# Patient Record
Sex: FEMALE | Race: ASIAN | NOT HISPANIC OR LATINO | URBAN - METROPOLITAN AREA
[De-identification: names, ages, dates, MRNs, and addresses within clinical notes are randomized per-mention and may not be internally consistent; named-entity substitution may affect disease eponyms.]

---

## 2017-01-16 ENCOUNTER — FOLLOW UP ESTABLISHED (OUTPATIENT)
Dept: URBAN - METROPOLITAN AREA CLINIC 51 | Facility: CLINIC | Age: 82
End: 2017-01-16
Payer: MEDICARE

## 2017-01-16 PROCEDURE — 92133 CPTRZD OPH DX IMG PST SGM ON: CPT | Performed by: OPHTHALMOLOGY

## 2017-01-16 PROCEDURE — 92083 EXTENDED VISUAL FIELD XM: CPT | Performed by: OPHTHALMOLOGY

## 2017-01-16 PROCEDURE — 92012 INTRM OPH EXAM EST PATIENT: CPT | Performed by: OPHTHALMOLOGY

## 2017-01-16 RX ORDER — BRINZOLAMIDE 10 MG/ML
1 % SUSPENSION/ DROPS OPHTHALMIC
Qty: 3 | Refills: 1 | Status: INACTIVE
Start: 2017-01-16 | End: 2017-03-23

## 2017-01-16 RX ORDER — TAFLUPROST 0 MG/.3ML
0.0015 % SOLUTION/ DROPS OPHTHALMIC
Qty: 3 | Refills: 3 | Status: INACTIVE
Start: 2017-01-16 | End: 2017-10-06

## 2017-01-16 ASSESSMENT — INTRAOCULAR PRESSURE
OS: 19
OD: 18

## 2017-03-23 ENCOUNTER — FOLLOW UP ESTABLISHED (OUTPATIENT)
Dept: URBAN - METROPOLITAN AREA CLINIC 44 | Facility: CLINIC | Age: 82
End: 2017-03-23
Payer: MEDICARE

## 2017-03-23 PROCEDURE — 92012 INTRM OPH EXAM EST PATIENT: CPT | Performed by: OPHTHALMOLOGY

## 2017-03-23 RX ORDER — BRINZOLAMIDE 10 MG/ML
1 % SUSPENSION/ DROPS OPHTHALMIC
Qty: 3 | Refills: 1 | Status: INACTIVE
Start: 2017-03-23 | End: 2017-06-19

## 2017-03-23 ASSESSMENT — INTRAOCULAR PRESSURE
OD: 15
OS: 15

## 2017-06-19 ENCOUNTER — FOLLOW UP ESTABLISHED (OUTPATIENT)
Dept: URBAN - METROPOLITAN AREA CLINIC 51 | Facility: CLINIC | Age: 82
End: 2017-06-19
Payer: MEDICARE

## 2017-06-19 PROCEDURE — 92012 INTRM OPH EXAM EST PATIENT: CPT | Performed by: OPHTHALMOLOGY

## 2017-06-19 RX ORDER — BRINZOLAMIDE 10 MG/ML
1 % SUSPENSION/ DROPS OPHTHALMIC
Qty: 3 | Refills: 1 | Status: INACTIVE
Start: 2017-06-19 | End: 2017-10-06

## 2017-06-19 ASSESSMENT — INTRAOCULAR PRESSURE
OS: 14
OD: 15

## 2017-10-06 ENCOUNTER — FOLLOW UP ESTABLISHED (OUTPATIENT)
Dept: URBAN - METROPOLITAN AREA CLINIC 51 | Facility: CLINIC | Age: 82
End: 2017-10-06
Payer: MEDICARE

## 2017-10-06 PROCEDURE — 92133 CPTRZD OPH DX IMG PST SGM ON: CPT | Performed by: OPHTHALMOLOGY

## 2017-10-06 PROCEDURE — 92012 INTRM OPH EXAM EST PATIENT: CPT | Performed by: OPHTHALMOLOGY

## 2017-10-06 RX ORDER — TAFLUPROST 0 MG/.3ML
0.0015 % SOLUTION/ DROPS OPHTHALMIC
Qty: 3 | Refills: 3 | Status: INACTIVE
Start: 2017-10-06 | End: 2018-10-15

## 2017-10-06 RX ORDER — BRINZOLAMIDE 10 MG/ML
1 % SUSPENSION/ DROPS OPHTHALMIC
Qty: 3 | Refills: 1 | Status: INACTIVE
Start: 2017-10-06 | End: 2018-01-22

## 2017-10-06 ASSESSMENT — INTRAOCULAR PRESSURE
OD: 15
OS: 15

## 2018-01-22 ENCOUNTER — FOLLOW UP ESTABLISHED (OUTPATIENT)
Dept: URBAN - METROPOLITAN AREA CLINIC 51 | Facility: CLINIC | Age: 83
End: 2018-01-22
Payer: MEDICARE

## 2018-01-22 PROCEDURE — 92083 EXTENDED VISUAL FIELD XM: CPT | Performed by: OPHTHALMOLOGY

## 2018-01-22 PROCEDURE — 92014 COMPRE OPH EXAM EST PT 1/>: CPT | Performed by: OPHTHALMOLOGY

## 2018-01-22 RX ORDER — DORZOLAMIDE HYDROCHLORIDE AND TIMOLOL MALEATE 20; 5 MG/ML; MG/ML
SOLUTION/ DROPS OPHTHALMIC
Qty: 3 | Refills: 1 | Status: INACTIVE
Start: 2018-01-22 | End: 2018-01-24

## 2018-01-22 ASSESSMENT — INTRAOCULAR PRESSURE
OS: 21
OD: 21

## 2018-03-05 ENCOUNTER — FOLLOW UP ESTABLISHED (OUTPATIENT)
Dept: URBAN - METROPOLITAN AREA CLINIC 51 | Facility: CLINIC | Age: 83
End: 2018-03-05
Payer: MEDICARE

## 2018-03-05 PROCEDURE — 92012 INTRM OPH EXAM EST PATIENT: CPT | Performed by: OPHTHALMOLOGY

## 2018-03-05 ASSESSMENT — INTRAOCULAR PRESSURE
OD: 16
OS: 16

## 2018-07-06 ENCOUNTER — FOLLOW UP ESTABLISHED (OUTPATIENT)
Dept: URBAN - METROPOLITAN AREA CLINIC 51 | Facility: CLINIC | Age: 83
End: 2018-07-06
Payer: MEDICARE

## 2018-07-06 PROCEDURE — 92014 COMPRE OPH EXAM EST PT 1/>: CPT | Performed by: OPHTHALMOLOGY

## 2018-07-06 ASSESSMENT — INTRAOCULAR PRESSURE
OD: 16
OS: 16

## 2018-10-15 ENCOUNTER — FOLLOW UP ESTABLISHED (OUTPATIENT)
Dept: URBAN - METROPOLITAN AREA CLINIC 51 | Facility: CLINIC | Age: 83
End: 2018-10-15
Payer: MEDICARE

## 2018-10-15 PROCEDURE — 92083 EXTENDED VISUAL FIELD XM: CPT | Performed by: OPHTHALMOLOGY

## 2018-10-15 PROCEDURE — 92012 INTRM OPH EXAM EST PATIENT: CPT | Performed by: OPHTHALMOLOGY

## 2018-10-15 PROCEDURE — 92133 CPTRZD OPH DX IMG PST SGM ON: CPT | Performed by: OPHTHALMOLOGY

## 2018-10-15 RX ORDER — TAFLUPROST 0 MG/.3ML
0.0015 % SOLUTION/ DROPS OPHTHALMIC
Qty: 90 | Refills: 1 | Status: INACTIVE
Start: 2018-10-15 | End: 2019-09-10

## 2018-10-15 ASSESSMENT — INTRAOCULAR PRESSURE
OD: 14
OS: 15

## 2019-01-21 ENCOUNTER — FOLLOW UP ESTABLISHED (OUTPATIENT)
Dept: URBAN - METROPOLITAN AREA CLINIC 51 | Facility: CLINIC | Age: 84
End: 2019-01-21
Payer: MEDICARE

## 2019-01-21 DIAGNOSIS — H04.123 DRY EYE SYNDROME OF BILATERAL LACRIMAL GLANDS: ICD-10-CM

## 2019-01-21 PROCEDURE — 92012 INTRM OPH EXAM EST PATIENT: CPT | Performed by: OPHTHALMOLOGY

## 2019-01-21 ASSESSMENT — INTRAOCULAR PRESSURE
OS: 15
OD: 15

## 2019-06-07 ENCOUNTER — FOLLOW UP ESTABLISHED (OUTPATIENT)
Dept: URBAN - METROPOLITAN AREA CLINIC 51 | Facility: CLINIC | Age: 84
End: 2019-06-07
Payer: MEDICARE

## 2019-06-07 DIAGNOSIS — H40.1132 PRIMARY OPEN-ANGLE GLAUCOMA, BILATERAL, MODERATE STAGE: Primary | ICD-10-CM

## 2019-06-07 PROCEDURE — 92012 INTRM OPH EXAM EST PATIENT: CPT | Performed by: OPHTHALMOLOGY

## 2019-06-07 PROCEDURE — 92083 EXTENDED VISUAL FIELD XM: CPT | Performed by: OPHTHALMOLOGY

## 2019-06-07 ASSESSMENT — INTRAOCULAR PRESSURE
OS: 14
OD: 15

## 2019-10-28 ENCOUNTER — FOLLOW UP ESTABLISHED (OUTPATIENT)
Dept: URBAN - METROPOLITAN AREA CLINIC 51 | Facility: CLINIC | Age: 84
End: 2019-10-28
Payer: MEDICARE

## 2019-10-28 PROCEDURE — 92014 COMPRE OPH EXAM EST PT 1/>: CPT | Performed by: OPHTHALMOLOGY

## 2019-10-28 PROCEDURE — 92083 EXTENDED VISUAL FIELD XM: CPT | Performed by: OPHTHALMOLOGY

## 2019-10-28 ASSESSMENT — INTRAOCULAR PRESSURE
OS: 16
OD: 16

## 2019-12-23 ENCOUNTER — FOLLOW UP ESTABLISHED (OUTPATIENT)
Dept: URBAN - METROPOLITAN AREA CLINIC 51 | Facility: CLINIC | Age: 84
End: 2019-12-23
Payer: MEDICARE

## 2019-12-23 PROCEDURE — 92012 INTRM OPH EXAM EST PATIENT: CPT | Performed by: OPHTHALMOLOGY

## 2019-12-23 PROCEDURE — 92133 CPTRZD OPH DX IMG PST SGM ON: CPT | Performed by: OPHTHALMOLOGY

## 2019-12-23 RX ORDER — DORZOLAMIDE HYDROCHLORIDE AND TIMOLOL MALEATE 20; 5 MG/ML; MG/ML
SOLUTION/ DROPS OPHTHALMIC
Qty: 60 | Refills: 3 | Status: INACTIVE
Start: 2019-12-23 | End: 2020-02-26

## 2019-12-23 ASSESSMENT — INTRAOCULAR PRESSURE
OS: 15
OD: 15

## 2020-03-16 ENCOUNTER — FOLLOW UP ESTABLISHED (OUTPATIENT)
Dept: URBAN - METROPOLITAN AREA CLINIC 51 | Facility: CLINIC | Age: 85
End: 2020-03-16
Payer: MEDICARE

## 2020-03-16 PROCEDURE — 92012 INTRM OPH EXAM EST PATIENT: CPT | Performed by: OPHTHALMOLOGY

## 2020-03-16 ASSESSMENT — INTRAOCULAR PRESSURE
OS: 15
OD: 15

## 2020-06-22 ENCOUNTER — FOLLOW UP ESTABLISHED (OUTPATIENT)
Dept: URBAN - METROPOLITAN AREA CLINIC 51 | Facility: CLINIC | Age: 85
End: 2020-06-22
Payer: MEDICARE

## 2020-06-22 PROCEDURE — 92014 COMPRE OPH EXAM EST PT 1/>: CPT | Performed by: OPHTHALMOLOGY

## 2020-06-22 PROCEDURE — 92083 EXTENDED VISUAL FIELD XM: CPT | Performed by: OPHTHALMOLOGY

## 2020-06-22 ASSESSMENT — INTRAOCULAR PRESSURE
OS: 15
OD: 15

## 2020-10-26 ENCOUNTER — FOLLOW UP ESTABLISHED (OUTPATIENT)
Dept: URBAN - METROPOLITAN AREA CLINIC 51 | Facility: CLINIC | Age: 85
End: 2020-10-26
Payer: MEDICARE

## 2020-10-26 PROCEDURE — 92014 COMPRE OPH EXAM EST PT 1/>: CPT | Performed by: OPHTHALMOLOGY

## 2020-10-26 PROCEDURE — 92133 CPTRZD OPH DX IMG PST SGM ON: CPT | Performed by: OPHTHALMOLOGY

## 2020-10-26 ASSESSMENT — INTRAOCULAR PRESSURE
OS: 15
OD: 15

## 2021-02-15 ENCOUNTER — FOLLOW UP ESTABLISHED (OUTPATIENT)
Dept: URBAN - METROPOLITAN AREA CLINIC 51 | Facility: CLINIC | Age: 86
End: 2021-02-15
Payer: MEDICARE

## 2021-02-15 PROCEDURE — 99213 OFFICE O/P EST LOW 20 MIN: CPT | Performed by: OPHTHALMOLOGY

## 2021-02-15 PROCEDURE — 92083 EXTENDED VISUAL FIELD XM: CPT | Performed by: OPHTHALMOLOGY

## 2021-02-15 ASSESSMENT — INTRAOCULAR PRESSURE
OS: 14
OD: 15

## 2021-06-07 ENCOUNTER — OFFICE VISIT (OUTPATIENT)
Dept: URBAN - METROPOLITAN AREA CLINIC 51 | Facility: CLINIC | Age: 86
End: 2021-06-07
Payer: MEDICARE

## 2021-06-07 PROCEDURE — 99213 OFFICE O/P EST LOW 20 MIN: CPT | Performed by: OPHTHALMOLOGY

## 2021-06-07 ASSESSMENT — INTRAOCULAR PRESSURE
OD: 14
OS: 14

## 2021-06-07 NOTE — IMPRESSION/PLAN
Impression: Primary open-angle glaucoma, moderate stage, bilateral
- little response to quiana and brim in past?  ; - s/p slt ou ( ~5/14 ) -denies family hx of glaucoma -denies sleep apnea/migranes/ heart/lung problems -denies sulfa allergy Plan: Plan: On Zioptan QHS OU and Cosopt PF BID OU ; IOP is doing well ou, so may cont same meds and RTC in 3-4 months for iop check/OCT     ((Target ~< 17 ou )) TESTS: 
2/15/21 VF OD) inferior depression, enlarged blind spot -. VF OS) inferior nasal depression ,.
10/26/20 OCT RNFL OD) 67 (64 , 61, 66,67, 72) sup thinning; artifacts. OCT RNFL OS) 63 (63 ,61, 64,66, 68) pole thinning. 6/7/19 FDT OD) sup and inf depression. FDT OS) nasal and temp scatter. 8/17/15 Photo OD) hazy, cupping, no hemes. Photo OS) hazy, cupping, no hemes. Discussed Glaucoma diagnosis in detail with patient. Emphasized and explained compliance. Poor compliance can lead to blindness. BRING YOUR DROPS EVERY VISIT.

## 2021-10-22 ENCOUNTER — OFFICE VISIT (OUTPATIENT)
Dept: URBAN - METROPOLITAN AREA CLINIC 51 | Facility: CLINIC | Age: 86
End: 2021-10-22
Payer: MEDICARE

## 2021-10-22 PROCEDURE — 99214 OFFICE O/P EST MOD 30 MIN: CPT | Performed by: OPHTHALMOLOGY

## 2021-10-22 PROCEDURE — 92133 CPTRZD OPH DX IMG PST SGM ON: CPT | Performed by: OPHTHALMOLOGY

## 2021-10-22 ASSESSMENT — INTRAOCULAR PRESSURE
OD: 14
OS: 14

## 2021-10-22 NOTE — IMPRESSION/PLAN
Impression: Primary open-angle glaucoma, moderate stage, bilateral
- little response to quiana and brim in past?  ; - s/p slt ou ( ~5/14 ) -denies family hx of glaucoma -denies sleep apnea/migranes/ heart/lung problems -denies sulfa allergy Plan: Plan: On Zioptan QHS OU and Cosopt PF BID OU ; OCT is similar: IOP is holding  well ou, so may cont same meds and RTC in 3-4 months for iop check/VFT     ((Target ~< 17 ou )) TESTS: 
10/22/21OCT - OD: Good-68(67,64 (61, 66,67, 72) sup thinning; artifacts; OS: Good-62(63,63 ,61, 64,66, 68) pole thinning: artifacts 2/15/21 VF OD) inferior depression, enlarged blind spot -. VF OS) inferior nasal depression ,.
6/7/19 FDT OD) sup and inf depression. FDT OS) nasal and temp scatter. 8/17/15 Photo OD) hazy, cupping, no hemes. Photo OS) hazy, cupping, no hemes. Discussed Glaucoma diagnosis in detail with patient. Emphasized and explained compliance. Poor compliance can lead to blindness. BRING YOUR DROPS EVERY VISIT.

## 2022-01-21 ENCOUNTER — OFFICE VISIT (OUTPATIENT)
Dept: URBAN - METROPOLITAN AREA CLINIC 44 | Facility: CLINIC | Age: 87
End: 2022-01-21
Payer: MEDICARE

## 2022-01-21 PROCEDURE — 99213 OFFICE O/P EST LOW 20 MIN: CPT | Performed by: OPHTHALMOLOGY

## 2022-01-21 PROCEDURE — 92083 EXTENDED VISUAL FIELD XM: CPT | Performed by: OPHTHALMOLOGY

## 2022-01-21 ASSESSMENT — INTRAOCULAR PRESSURE
OD: 13
OS: 14

## 2022-01-21 NOTE — IMPRESSION/PLAN
Impression: Primary open-angle glaucoma, moderate stage, bilateral
- little response to quiana and brim in past?  ; - s/p slt ou ( ~5/14 ) -denies family hx of glaucoma -denies sleep apnea/migranes/ heart/lung problems -denies sulfa allergy Plan: Plan: On Zioptan QHS OU and Cosopt PF BID OU ; VFT  is fluctuates but IOP is stable ou, so may cont same meds and RTC in 3-4 months for iop check/FDT   ((Target ~< 17 ou )) TESTS: 
01/21/22 Visual Field - OD: Fair-superior , inferior nasal  depression, enlarged blind spot -; OS: Fair-superior , inferior nasal  depression, enlarged blind spot -
10/22/21OCT - OD: Good-68(67,64 (61, 66,67, 72) sup thinning; artifacts; OS: Good-62(63,63 ,61, 64,66, 68) pole thinning: artifacts 6/7/19 FDT OD) sup and inf depression. FDT OS) nasal and temp scatter. 8/17/15 Photo OD) hazy, cupping, no hemes. Photo OS) hazy, cupping, no hemes. Discussed Glaucoma diagnosis in detail with patient. Emphasized and explained compliance. Poor compliance can lead to blindness. BRING YOUR DROPS EVERY VISIT.

## 2022-05-23 ENCOUNTER — OFFICE VISIT (OUTPATIENT)
Dept: URBAN - METROPOLITAN AREA CLINIC 51 | Facility: CLINIC | Age: 87
End: 2022-05-23
Payer: MEDICARE

## 2022-05-23 DIAGNOSIS — H40.1132 PRIMARY OPEN-ANGLE GLAUCOMA, BILATERAL, MODERATE STAGE: Primary | ICD-10-CM

## 2022-05-23 DIAGNOSIS — H04.123 DRY EYE SYNDROME OF BILATERAL LACRIMAL GLANDS: ICD-10-CM

## 2022-05-23 PROCEDURE — 92082 INTERMEDIATE VISUAL FIELD XM: CPT | Performed by: OPHTHALMOLOGY

## 2022-05-23 PROCEDURE — 99214 OFFICE O/P EST MOD 30 MIN: CPT | Performed by: OPHTHALMOLOGY

## 2022-05-23 ASSESSMENT — INTRAOCULAR PRESSURE
OD: 13
OS: 13

## 2022-05-23 NOTE — IMPRESSION/PLAN
Impression: Primary open-angle glaucoma, moderate stage, bilateral
- little response to quiana and brim in past?  ; - s/p slt ou ( ~5/14 ) -denies family hx of glaucoma -denies sleep apnea/migranes/ heart/lung problems -denies sulfa allergy Plan: Plan: On Zioptan QHS OU and Cosopt PF BID OU ;  FDT has inferior depression OS    and  IOP is holding  well ou, so may cont same meds and RTC in 3-4 months for iop check/oct   ((Target ~< 17 ou )) TESTS: 
05/23/22 FDT - OD: Good-full; OS: Good-inferior depression 01/21/22 Visual Field - OD: Fair-superior , inferior nasal  depression, enlarged blind spot -; OS: Fair-superior , inferior nasal  depression, enlarged blind spot -
10/22/21OCT - OD: Good-68(67,64 (61, 66,67, 72) sup thinning; artifacts; OS: Good-62(63,63 ,61, 64,66, 68) pole thinning: artifacts 6/7/19 FDT OD) sup and inf depression. FDT OS) nasal and temp scatter. 8/17/15 Photo OD) hazy, cupping, no hemes. Photo OS) hazy, cupping, no hemes. Discussed Glaucoma diagnosis in detail with patient. Emphasized and explained compliance. Poor compliance can lead to blindness. BRING YOUR DROPS EVERY VISIT.

## 2022-09-12 ENCOUNTER — OFFICE VISIT (OUTPATIENT)
Dept: URBAN - METROPOLITAN AREA CLINIC 51 | Facility: CLINIC | Age: 87
End: 2022-09-12
Payer: MEDICARE

## 2022-09-12 DIAGNOSIS — H40.1132 PRIMARY OPEN-ANGLE GLAUCOMA, BILATERAL, MODERATE STAGE: Primary | ICD-10-CM

## 2022-09-12 DIAGNOSIS — H04.123 DRY EYE SYNDROME OF BILATERAL LACRIMAL GLANDS: ICD-10-CM

## 2022-09-12 PROCEDURE — 92133 CPTRZD OPH DX IMG PST SGM ON: CPT | Performed by: OPHTHALMOLOGY

## 2022-09-12 PROCEDURE — 99213 OFFICE O/P EST LOW 20 MIN: CPT | Performed by: OPHTHALMOLOGY

## 2022-09-12 ASSESSMENT — INTRAOCULAR PRESSURE
OD: 13
OS: 13

## 2022-09-12 NOTE — IMPRESSION/PLAN
Impression: Primary open-angle glaucoma, moderate stage, bilateral
- little response to quiana and brim in past?  ; - s/p slt ou ( ~5/14 ) -denies family hx of glaucoma -denies sleep apnea/migranes/ heart/lung problems -denies sulfa allergy Plan: Plan: On Zioptan QHS OU and Cosopt PF BID OU ; OCT is similar ou and IOP is doing well ou, so may cont same meds and RTC in 3-4 months for iop check and VFT   ((Target ~< 17 ou )) TESTS: 
9/12/22 OCT - OD: Fair-67 (68, 67,64, 61, 66,67, 72) sup thinning; artifacts; OS: Fair-64 (62, 63,63, 61, 64,66, 68) pole thinning 5/23/22 FDT - OD: Good-full; OS: Good-inferior depression 1/21/22 Visual Field - OD: Fair-superior , inferior nasal  depression, enlarged blind spot -; OS: Fair-superior , inferior nasal  depression, enlarged blind spot -
6/7/19 FDT OD) sup and inf depression. FDT OS) nasal and temp scatter. 8/17/15 Photo OD) hazy, cupping, no hemes. Photo OS) hazy, cupping, no hemes. Discussed Glaucoma diagnosis in detail with patient. Emphasized and explained compliance. Poor compliance can lead to blindness. BRING YOUR DROPS EVERY VISIT.

## 2023-01-13 ENCOUNTER — OFFICE VISIT (OUTPATIENT)
Dept: URBAN - METROPOLITAN AREA CLINIC 51 | Facility: CLINIC | Age: 88
End: 2023-01-13
Payer: MEDICARE

## 2023-01-13 DIAGNOSIS — H04.123 DRY EYE SYNDROME OF BILATERAL LACRIMAL GLANDS: ICD-10-CM

## 2023-01-13 DIAGNOSIS — H40.1132 PRIMARY OPEN-ANGLE GLAUCOMA, BILATERAL, MODERATE STAGE: Primary | ICD-10-CM

## 2023-01-13 PROCEDURE — 92020 GONIOSCOPY: CPT | Performed by: OPHTHALMOLOGY

## 2023-01-13 PROCEDURE — 99214 OFFICE O/P EST MOD 30 MIN: CPT | Performed by: OPHTHALMOLOGY

## 2023-01-13 PROCEDURE — 92083 EXTENDED VISUAL FIELD XM: CPT | Performed by: OPHTHALMOLOGY

## 2023-01-13 RX ORDER — PREDNISOLONE ACETATE 10 MG/ML
1 % SUSPENSION/ DROPS OPHTHALMIC
Qty: 5 | Refills: 1 | Status: ACTIVE
Start: 2023-01-13

## 2023-01-13 ASSESSMENT — KERATOMETRY
OS: 42.16
OD: 41.95

## 2023-01-13 ASSESSMENT — INTRAOCULAR PRESSURE
OD: 20
OS: 19

## 2023-01-13 NOTE — IMPRESSION/PLAN
Impression: Primary open-angle glaucoma, moderate stage, bilateral
- little response to quiana and brim in past?  ; - s/p slt ou ( ~5/14 ) -denies family hx of glaucoma -denies sleep apnea/migranes/ heart/lung problems -denies sulfa allergy Plan: Plan: On Zioptan QHS OU and Cosopt PF BID OU ; VFT is similar ou but IOP is higher ou , so rec lower iop ou,  discussed options, desires slt os then od , will schedule ,  cont same meds  ((Target ~< 17 ou )) TESTS: 
1/13/22 Visual Field - OD: poor-superior , inferior nasal  depression, enlarged blind spot -; OS: poor-superior , inferior nasal  depression, enlarged blind spot 9/12/22 OCT - OD: Fair-67 (68, 67,64, 61, 66,67, 72) sup thinning; artifacts; OS: Fair-64 (62, 63,63, 61, 64,66, 68) pole thinning 5/23/22 FDT - OD: Good-full; OS: Good-inferior depression 6/7/19 FDT OD) sup and inf depression. FDT OS) nasal and temp scatter. 8/17/15 Photo OD) hazy, cupping, no hemes. Photo OS) hazy, cupping, no hemes. Discussed Glaucoma diagnosis in detail with patient. Emphasized and explained compliance. Poor compliance can lead to blindness. BRING YOUR DROPS EVERY VISIT. For SLT: 1. The patient is aware that SLT can lower IOP  for a period of time. 2. The patient is aware that SLT does not replace their current eye drop medications. 3. The patient is aware the SLT will not improve their vision. 4. The patient is aware the SLT will not cure their glaucoma nor replace any other medical or surgical treatments.  Given brochure

## 2023-03-10 ENCOUNTER — OFFICE VISIT (OUTPATIENT)
Dept: URBAN - METROPOLITAN AREA CLINIC 51 | Facility: CLINIC | Age: 88
End: 2023-03-10
Payer: MEDICARE

## 2023-03-10 DIAGNOSIS — H40.1132 PRIMARY OPEN-ANGLE GLAUCOMA, BILATERAL, MODERATE STAGE: Primary | ICD-10-CM

## 2023-03-10 DIAGNOSIS — H04.123 DRY EYE SYNDROME OF BILATERAL LACRIMAL GLANDS: ICD-10-CM

## 2023-03-10 PROCEDURE — 99213 OFFICE O/P EST LOW 20 MIN: CPT | Performed by: OPHTHALMOLOGY

## 2023-03-10 ASSESSMENT — INTRAOCULAR PRESSURE
OS: 12
OD: 13

## 2023-03-10 NOTE — IMPRESSION/PLAN
Impression: Primary open-angle glaucoma, moderate stage, bilateral
- little response to quiana and brim in past?  ; - s/p slt ou ( ~5/14 ) -denies family hx of glaucoma -denies sleep apnea/migranes/ heart/lung problems -denies sulfa allergy Plan: Plan: On Zioptan QHS OU and Cosopt PF BID OU ; s/p SLT OU; IOP is improving ou s/p slt ou, so continue same medications return to clinic in 2-3 months for IOP/OCT  ((Target ~< 17 ou )) TESTS: 
1/13/23 Visual Field - OD: poor-superior , inferior nasal  depression, enlarged blind spot -; OS: poor-superior , inferior nasal  depression, enlarged blind spot 9/12/22 OCT - OD: Fair-67 (68, 67,64, 61, 66,67, 72) sup thinning; artifacts; OS: Fair-64 (62, 63,63, 61, 64,66, 68) pole thinning 5/23/22 FDT - OD: Good-full; OS: Good-inferior depression 8/17/15 Photo OD) hazy, cupping, no hemes. Photo OS) hazy, cupping, no hemes. Discussed Glaucoma diagnosis in detail with patient. Emphasized and explained compliance. Poor compliance can lead to blindness. BRING YOUR DROPS EVERY VISIT.

## 2023-05-26 ENCOUNTER — OFFICE VISIT (OUTPATIENT)
Dept: URBAN - METROPOLITAN AREA CLINIC 51 | Facility: CLINIC | Age: 88
End: 2023-05-26
Payer: MEDICARE

## 2023-05-26 DIAGNOSIS — H04.123 DRY EYE SYNDROME OF BILATERAL LACRIMAL GLANDS: ICD-10-CM

## 2023-05-26 DIAGNOSIS — H40.1132 PRIMARY OPEN-ANGLE GLAUCOMA, BILATERAL, MODERATE STAGE: Primary | ICD-10-CM

## 2023-05-26 PROCEDURE — 99213 OFFICE O/P EST LOW 20 MIN: CPT | Performed by: OPHTHALMOLOGY

## 2023-05-26 PROCEDURE — 92133 CPTRZD OPH DX IMG PST SGM ON: CPT | Performed by: OPHTHALMOLOGY

## 2023-05-26 ASSESSMENT — INTRAOCULAR PRESSURE
OS: 13
OD: 13

## 2023-05-26 NOTE — IMPRESSION/PLAN
Impression: Primary open-angle glaucoma, moderate stage, bilateral
- little response to quiana and brim in past?  ; - s/p slt ou ( ~5/14 ) -denies family hx of glaucoma -denies sleep apnea/migranes/ heart/lung problems -denies sulfa allergy Plan: Plan: On Zioptan QHS OU and Cosopt PF BID OU ; s/p SLT OU; OCT is  similar OU     ,and  IOP is holding  ou s/p slt ou, so continue same medications return to clinic in 2-3 months for IOP/FDT  ((Target ~< 17 ou )) TESTS: 
05/26/23 OCT - OD: OSUP-37(44,98, 67,64, 61, 66,67, 72) sup thinning; artifacts; OS: Fair-63(64,62, 63,63, 61, 64,66, 68) pole thinning 1/13/23 Visual Field - OD: poor-superior , inferior nasal  depression, enlarged blind spot -; OS: poor-superior , inferior nasal  depression, enlarged blind spot 5/23/22 FDT - OD: Good-full; OS: Good-inferior depression 8/17/15 Photo OD) hazy, cupping, no hemes. Photo OS) hazy, cupping, no hemes. Discussed Glaucoma diagnosis in detail with patient. Emphasized and explained compliance. Poor compliance can lead to blindness. BRING YOUR DROPS EVERY VISIT.

## 2023-08-18 ENCOUNTER — OFFICE VISIT (OUTPATIENT)
Dept: URBAN - METROPOLITAN AREA CLINIC 51 | Facility: CLINIC | Age: 88
End: 2023-08-18
Payer: MEDICARE

## 2023-08-18 DIAGNOSIS — H04.123 DRY EYE SYNDROME OF BILATERAL LACRIMAL GLANDS: ICD-10-CM

## 2023-08-18 DIAGNOSIS — H40.1132 PRIMARY OPEN-ANGLE GLAUCOMA, BILATERAL, MODERATE STAGE: Primary | ICD-10-CM

## 2023-08-18 PROCEDURE — 99214 OFFICE O/P EST MOD 30 MIN: CPT | Performed by: OPHTHALMOLOGY

## 2023-08-18 PROCEDURE — 92082 INTERMEDIATE VISUAL FIELD XM: CPT | Performed by: OPHTHALMOLOGY

## 2023-08-18 RX ORDER — DORZOLAMIDE HYDROCHLORIDE AND TIMOLOL MALEATE 20; 5 MG/ML; MG/ML
SOLUTION/ DROPS OPHTHALMIC
Qty: 180 | Refills: 1 | Status: ACTIVE
Start: 2023-08-18

## 2023-08-18 ASSESSMENT — INTRAOCULAR PRESSURE
OS: 13
OD: 13

## 2023-10-13 ENCOUNTER — OFFICE VISIT (OUTPATIENT)
Dept: URBAN - METROPOLITAN AREA CLINIC 51 | Facility: CLINIC | Age: 88
End: 2023-10-13
Payer: MEDICARE

## 2023-10-13 DIAGNOSIS — H04.123 DRY EYE SYNDROME OF BILATERAL LACRIMAL GLANDS: ICD-10-CM

## 2023-10-13 DIAGNOSIS — H40.1132 PRIMARY OPEN-ANGLE GLAUCOMA, BILATERAL, MODERATE STAGE: Primary | ICD-10-CM

## 2023-10-13 PROCEDURE — 99214 OFFICE O/P EST MOD 30 MIN: CPT | Performed by: OPHTHALMOLOGY

## 2023-10-13 RX ORDER — LATANOPROSTENE BUNOD 0.24 MG/ML
0.024 % SOLUTION/ DROPS OPHTHALMIC
Qty: 2.5 | Refills: 5 | Status: ACTIVE
Start: 2023-10-13

## 2023-10-13 ASSESSMENT — INTRAOCULAR PRESSURE
OS: 19
OD: 14

## 2023-11-10 ENCOUNTER — OFFICE VISIT (OUTPATIENT)
Dept: URBAN - METROPOLITAN AREA CLINIC 51 | Facility: CLINIC | Age: 88
End: 2023-11-10
Payer: MEDICARE

## 2023-11-10 DIAGNOSIS — H04.123 DRY EYE SYNDROME OF BILATERAL LACRIMAL GLANDS: ICD-10-CM

## 2023-11-10 DIAGNOSIS — H40.1132 PRIMARY OPEN-ANGLE GLAUCOMA, BILATERAL, MODERATE STAGE: Primary | ICD-10-CM

## 2023-11-10 PROCEDURE — 92083 EXTENDED VISUAL FIELD XM: CPT | Performed by: OPHTHALMOLOGY

## 2023-11-10 PROCEDURE — 99214 OFFICE O/P EST MOD 30 MIN: CPT | Performed by: OPHTHALMOLOGY

## 2023-11-10 PROCEDURE — 92133 CPTRZD OPH DX IMG PST SGM ON: CPT | Performed by: OPHTHALMOLOGY

## 2023-11-10 ASSESSMENT — INTRAOCULAR PRESSURE
OS: 20
OD: 17

## 2023-12-04 ENCOUNTER — OFFICE VISIT (OUTPATIENT)
Dept: URBAN - METROPOLITAN AREA CLINIC 51 | Facility: CLINIC | Age: 88
End: 2023-12-04
Payer: MEDICARE

## 2023-12-04 DIAGNOSIS — H04.123 DRY EYE SYNDROME OF BILATERAL LACRIMAL GLANDS: ICD-10-CM

## 2023-12-04 DIAGNOSIS — H40.1132 PRIMARY OPEN-ANGLE GLAUCOMA, BILATERAL, MODERATE STAGE: Primary | ICD-10-CM

## 2023-12-04 PROCEDURE — 99213 OFFICE O/P EST LOW 20 MIN: CPT | Performed by: OPHTHALMOLOGY

## 2023-12-04 PROCEDURE — 92083 EXTENDED VISUAL FIELD XM: CPT | Performed by: OPHTHALMOLOGY

## 2023-12-04 RX ORDER — LATANOPROSTENE BUNOD 0.24 MG/ML
0.024 % SOLUTION/ DROPS OPHTHALMIC
Qty: 2.5 | Refills: 5 | Status: ACTIVE
Start: 2023-12-04

## 2023-12-04 ASSESSMENT — KERATOMETRY
OS: 41.88
OD: 42.38

## 2023-12-04 ASSESSMENT — INTRAOCULAR PRESSURE
OD: 14
OS: 14

## 2024-03-11 ENCOUNTER — OFFICE VISIT (OUTPATIENT)
Dept: URBAN - METROPOLITAN AREA CLINIC 51 | Facility: CLINIC | Age: 89
End: 2024-03-11
Payer: MEDICARE

## 2024-03-11 DIAGNOSIS — H40.1132 PRIMARY OPEN-ANGLE GLAUCOMA, BILATERAL, MODERATE STAGE: Primary | ICD-10-CM

## 2024-03-11 DIAGNOSIS — H04.123 DRY EYE SYNDROME OF BILATERAL LACRIMAL GLANDS: ICD-10-CM

## 2024-03-11 PROCEDURE — 92133 CPTRZD OPH DX IMG PST SGM ON: CPT | Performed by: OPHTHALMOLOGY

## 2024-03-11 PROCEDURE — 99214 OFFICE O/P EST MOD 30 MIN: CPT | Performed by: OPHTHALMOLOGY

## 2024-03-11 ASSESSMENT — INTRAOCULAR PRESSURE
OS: 13
OD: 14

## 2024-03-26 ENCOUNTER — OFFICE VISIT (OUTPATIENT)
Dept: URBAN - METROPOLITAN AREA CLINIC 51 | Facility: CLINIC | Age: 89
End: 2024-03-26
Payer: MEDICARE

## 2024-03-26 DIAGNOSIS — H35.373 PUCKERING OF MACULA, BILATERAL: ICD-10-CM

## 2024-03-26 DIAGNOSIS — H04.123 DRY EYE SYNDROME OF BILATERAL LACRIMAL GLANDS: ICD-10-CM

## 2024-03-26 DIAGNOSIS — H02.831 DERMATOCHALASIS OF RIGHT UPPER EYELID: ICD-10-CM

## 2024-03-26 DIAGNOSIS — H40.1132 PRIMARY OPEN-ANGLE GLAUCOMA, BILATERAL, MODERATE STAGE: Primary | ICD-10-CM

## 2024-03-26 DIAGNOSIS — H11.32 SUBCONJUNCTIVAL HEMORRHAGE OF LEFT EYE: ICD-10-CM

## 2024-03-26 DIAGNOSIS — H52.4 PRESBYOPIA: ICD-10-CM

## 2024-03-26 DIAGNOSIS — H02.834 DERMATOCHALASIS OF LEFT UPPER EYELID: ICD-10-CM

## 2024-03-26 DIAGNOSIS — H43.813 VITREOUS DEGENERATION, BILATERAL: ICD-10-CM

## 2024-03-26 PROCEDURE — 99214 OFFICE O/P EST MOD 30 MIN: CPT | Performed by: OPTOMETRIST

## 2024-03-26 PROCEDURE — 92133 CPTRZD OPH DX IMG PST SGM ON: CPT | Performed by: OPTOMETRIST

## 2024-03-26 PROCEDURE — 92134 CPTRZ OPH DX IMG PST SGM RTA: CPT | Performed by: OPTOMETRIST

## 2024-03-26 PROCEDURE — BRUDE BRUDER EYE MOIST COMPRESS: CUSTOM | Performed by: OPTOMETRIST

## 2024-03-26 ASSESSMENT — INTRAOCULAR PRESSURE
OD: 16
OS: 15

## 2024-03-26 ASSESSMENT — VISUAL ACUITY
OD: 20/20
OS: 20/25

## 2024-06-03 ENCOUNTER — OFFICE VISIT (OUTPATIENT)
Dept: URBAN - METROPOLITAN AREA CLINIC 51 | Facility: CLINIC | Age: 89
End: 2024-06-03
Payer: MEDICARE

## 2024-06-03 DIAGNOSIS — H02.834 DERMATOCHALASIS OF LEFT UPPER EYELID: ICD-10-CM

## 2024-06-03 DIAGNOSIS — H40.1132 PRIMARY OPEN-ANGLE GLAUCOMA, BILATERAL, MODERATE STAGE: Primary | ICD-10-CM

## 2024-06-03 DIAGNOSIS — H35.373 PUCKERING OF MACULA, BILATERAL: ICD-10-CM

## 2024-06-03 DIAGNOSIS — H02.831 DERMATOCHALASIS OF RIGHT UPPER EYELID: ICD-10-CM

## 2024-06-03 DIAGNOSIS — H04.123 DRY EYE SYNDROME OF BILATERAL LACRIMAL GLANDS: ICD-10-CM

## 2024-06-03 DIAGNOSIS — H43.813 VITREOUS DEGENERATION, BILATERAL: ICD-10-CM

## 2024-06-03 PROCEDURE — 99213 OFFICE O/P EST LOW 20 MIN: CPT | Performed by: OPHTHALMOLOGY

## 2024-06-03 RX ORDER — DORZOLAMIDE HYDROCHLORIDE AND TIMOLOL MALEATE 20; 5 MG/ML; MG/ML
SOLUTION/ DROPS OPHTHALMIC
Qty: 180 | Refills: 3 | Status: ACTIVE
Start: 2024-06-03

## 2024-06-03 RX ORDER — LATANOPROSTENE BUNOD 0.24 MG/ML
0.024 % SOLUTION/ DROPS OPHTHALMIC
Qty: 2.5 | Refills: 5 | Status: ACTIVE
Start: 2024-06-03

## 2024-06-03 ASSESSMENT — INTRAOCULAR PRESSURE
OD: 13
OS: 13

## 2024-09-13 ENCOUNTER — OFFICE VISIT (OUTPATIENT)
Dept: URBAN - METROPOLITAN AREA CLINIC 51 | Facility: CLINIC | Age: 89
End: 2024-09-13
Payer: MEDICARE

## 2024-09-13 DIAGNOSIS — H43.813 VITREOUS DEGENERATION, BILATERAL: ICD-10-CM

## 2024-09-13 DIAGNOSIS — H02.834 DERMATOCHALASIS OF LEFT UPPER EYELID: ICD-10-CM

## 2024-09-13 DIAGNOSIS — H02.831 DERMATOCHALASIS OF RIGHT UPPER EYELID: ICD-10-CM

## 2024-09-13 DIAGNOSIS — H35.373 PUCKERING OF MACULA, BILATERAL: ICD-10-CM

## 2024-09-13 DIAGNOSIS — H04.123 DRY EYE SYNDROME OF BILATERAL LACRIMAL GLANDS: ICD-10-CM

## 2024-09-13 DIAGNOSIS — H40.1132 PRIMARY OPEN-ANGLE GLAUCOMA, BILATERAL, MODERATE STAGE: Primary | ICD-10-CM

## 2024-09-13 PROCEDURE — 92083 EXTENDED VISUAL FIELD XM: CPT | Performed by: OPHTHALMOLOGY

## 2024-09-13 PROCEDURE — 99213 OFFICE O/P EST LOW 20 MIN: CPT | Performed by: OPHTHALMOLOGY

## 2024-09-13 ASSESSMENT — INTRAOCULAR PRESSURE
OD: 12
OS: 13

## 2024-12-06 ENCOUNTER — OFFICE VISIT (OUTPATIENT)
Dept: URBAN - METROPOLITAN AREA CLINIC 51 | Facility: CLINIC | Age: 89
End: 2024-12-06
Payer: MEDICARE

## 2024-12-06 DIAGNOSIS — H40.1132 PRIMARY OPEN-ANGLE GLAUCOMA, BILATERAL, MODERATE STAGE: Primary | ICD-10-CM

## 2024-12-06 DIAGNOSIS — H02.831 DERMATOCHALASIS OF RIGHT UPPER EYELID: ICD-10-CM

## 2024-12-06 DIAGNOSIS — H02.834 DERMATOCHALASIS OF LEFT UPPER EYELID: ICD-10-CM

## 2024-12-06 DIAGNOSIS — H43.813 VITREOUS DEGENERATION, BILATERAL: ICD-10-CM

## 2024-12-06 DIAGNOSIS — H35.373 PUCKERING OF MACULA, BILATERAL: ICD-10-CM

## 2024-12-06 DIAGNOSIS — H04.123 DRY EYE SYNDROME OF BILATERAL LACRIMAL GLANDS: ICD-10-CM

## 2024-12-06 PROCEDURE — 99213 OFFICE O/P EST LOW 20 MIN: CPT | Performed by: OPHTHALMOLOGY

## 2024-12-06 ASSESSMENT — INTRAOCULAR PRESSURE
OS: 13
OD: 13

## 2025-03-28 ENCOUNTER — OFFICE VISIT (OUTPATIENT)
Dept: URBAN - METROPOLITAN AREA CLINIC 51 | Facility: CLINIC | Age: 89
End: 2025-03-28
Payer: MEDICARE

## 2025-03-28 DIAGNOSIS — H02.834 DERMATOCHALASIS OF LEFT UPPER EYELID: ICD-10-CM

## 2025-03-28 DIAGNOSIS — H43.813 VITREOUS DEGENERATION, BILATERAL: ICD-10-CM

## 2025-03-28 DIAGNOSIS — H40.1132 PRIMARY OPEN-ANGLE GLAUCOMA, BILATERAL, MODERATE STAGE: Primary | ICD-10-CM

## 2025-03-28 DIAGNOSIS — H04.123 DRY EYE SYNDROME OF BILATERAL LACRIMAL GLANDS: ICD-10-CM

## 2025-03-28 DIAGNOSIS — H35.373 PUCKERING OF MACULA, BILATERAL: ICD-10-CM

## 2025-03-28 DIAGNOSIS — H02.831 DERMATOCHALASIS OF RIGHT UPPER EYELID: ICD-10-CM

## 2025-03-28 PROCEDURE — 99214 OFFICE O/P EST MOD 30 MIN: CPT | Performed by: OPHTHALMOLOGY

## 2025-03-28 PROCEDURE — 92133 CPTRZD OPH DX IMG PST SGM ON: CPT | Performed by: OPHTHALMOLOGY

## 2025-03-28 RX ORDER — LATANOPROST 50 UG/ML
0.005 % SOLUTION OPHTHALMIC
Qty: 7.5 | Refills: 1 | Status: ACTIVE
Start: 2025-03-28

## 2025-03-28 RX ORDER — DORZOLAMIDE HYDROCHLORIDE AND TIMOLOL MALEATE 20; 5 MG/ML; MG/ML
SOLUTION/ DROPS OPHTHALMIC
Qty: 60 | Refills: 11 | Status: ACTIVE
Start: 2025-03-28

## 2025-03-28 RX ORDER — PREDNISOLONE ACETATE 10 MG/ML
1 % SUSPENSION/ DROPS OPHTHALMIC
Qty: 5 | Refills: 1 | Status: ACTIVE
Start: 2025-03-28

## 2025-03-28 ASSESSMENT — INTRAOCULAR PRESSURE
OS: 17
OD: 17

## 2025-05-15 ENCOUNTER — SURGERY (OUTPATIENT)
Dept: URBAN - METROPOLITAN AREA SURGERY 19 | Facility: SURGERY | Age: 89
End: 2025-05-15
Payer: MEDICARE

## 2025-05-15 PROCEDURE — 65855 TRABECULOPLASTY LASER SURG: CPT | Performed by: OPHTHALMOLOGY

## 2025-05-22 ENCOUNTER — SURGERY (OUTPATIENT)
Dept: URBAN - METROPOLITAN AREA SURGERY 19 | Facility: SURGERY | Age: 89
End: 2025-05-22
Payer: MEDICARE

## 2025-05-22 PROCEDURE — 65855 TRABECULOPLASTY LASER SURG: CPT | Performed by: OPHTHALMOLOGY

## 2025-06-30 ENCOUNTER — OFFICE VISIT (OUTPATIENT)
Dept: URBAN - METROPOLITAN AREA CLINIC 51 | Facility: CLINIC | Age: 89
End: 2025-06-30
Payer: MEDICARE

## 2025-06-30 DIAGNOSIS — H40.1132 PRIMARY OPEN-ANGLE GLAUCOMA, BILATERAL, MODERATE STAGE: Primary | ICD-10-CM

## 2025-06-30 DIAGNOSIS — H35.373 PUCKERING OF MACULA, BILATERAL: ICD-10-CM

## 2025-06-30 DIAGNOSIS — H43.813 VITREOUS DEGENERATION, BILATERAL: ICD-10-CM

## 2025-06-30 DIAGNOSIS — H04.123 DRY EYE SYNDROME OF BILATERAL LACRIMAL GLANDS: ICD-10-CM

## 2025-06-30 DIAGNOSIS — H02.831 DERMATOCHALASIS OF RIGHT UPPER EYELID: ICD-10-CM

## 2025-06-30 DIAGNOSIS — H02.834 DERMATOCHALASIS OF LEFT UPPER EYELID: ICD-10-CM

## 2025-06-30 PROCEDURE — 99213 OFFICE O/P EST LOW 20 MIN: CPT | Performed by: OPHTHALMOLOGY

## 2025-06-30 ASSESSMENT — INTRAOCULAR PRESSURE
OD: 13
OS: 13